# Patient Record
Sex: FEMALE | Race: WHITE | NOT HISPANIC OR LATINO | URBAN - METROPOLITAN AREA
[De-identification: names, ages, dates, MRNs, and addresses within clinical notes are randomized per-mention and may not be internally consistent; named-entity substitution may affect disease eponyms.]

---

## 2019-08-16 ENCOUNTER — HOSPITAL ENCOUNTER (OUTPATIENT)
Dept: URGENT CARE | Facility: CLINIC | Age: 29
Discharge: HOME | End: 2019-08-16

## 2019-08-19 LAB
AMOXICILLIN+CLAV SUSC ISLT: <=2
AMPICILLIN SUSC ISLT: <=2
AMPICILLIN+SULBAC SUSC ISLT: <=2
BACTERIA UR CULT: ABNORMAL
CEFAZOLIN SUSC ISLT: <=4
CEFEPIME SUSC ISLT: <=1
CEFTAZIDIME SUSC ISLT: <=1
CEFTRIAXONE SUSC ISLT: <=1
CEFUROXIME ORAL SUSC ISLT: 4
CEFUROXIME PARENTER SUSC ISLT: 4
CIPROFLOXACIN SUSC ISLT: <=0.25
ERTAPENEM SUSC ISLT: <=0.5
GENTAMICIN SUSC ISLT: <=1
LEVOFLOXACIN SUSC ISLT: <=0.12
NITROFURANTOIN SUSC ISLT: <=16
TETRACYCLINE SUSC ISLT: <=1
TMP SMX SUSC ISLT: <=20
TOBRAMYCIN SUSC ISLT: <=1

## 2020-07-30 ENCOUNTER — HOSPITAL ENCOUNTER (OUTPATIENT)
Dept: OTHER | Facility: HOSPITAL | Age: 30
Discharge: HOME | End: 2020-07-30
Attending: OBSTETRICS & GYNECOLOGY

## 2020-08-06 ENCOUNTER — HOSPITAL ENCOUNTER (OUTPATIENT)
Dept: PREADMISSION TESTING | Facility: HOSPITAL | Age: 30
Discharge: HOME | End: 2020-08-06
Attending: OBSTETRICS & GYNECOLOGY

## 2020-08-08 LAB — SARS-COV-2 RNA SPEC QL NAA+PROBE: NOT DETECTED

## 2020-08-11 ENCOUNTER — HOSPITAL ENCOUNTER (OUTPATIENT)
Dept: PERIOP | Facility: HOSPITAL | Age: 30
Setting detail: HOSPITAL OUTPATIENT SURGERY
Discharge: HOME | End: 2020-08-11
Attending: OBSTETRICS & GYNECOLOGY

## 2020-08-11 LAB — HCG UR QL: NEGATIVE

## 2020-10-22 ENCOUNTER — HOSPITAL ENCOUNTER (OUTPATIENT)
Dept: URGENT CARE | Facility: CLINIC | Age: 30
Discharge: HOME | End: 2020-10-22
Attending: NURSE PRACTITIONER

## 2020-10-25 LAB — SARS-COV-2 RNA SPEC QL NAA+PROBE: NOT DETECTED

## 2020-10-26 ENCOUNTER — HOSPITAL ENCOUNTER (OUTPATIENT)
Dept: URGENT CARE | Facility: CLINIC | Age: 30
Discharge: HOME | End: 2020-10-26
Attending: PHYSICIAN ASSISTANT

## 2020-10-26 LAB — GLUCOSE BLD-MCNC: 76 MG/DL (ref 65–99)

## 2020-11-04 ENCOUNTER — OFFICE VISIT CONVERTED (OUTPATIENT)
Dept: FAMILY MEDICINE CLINIC | Facility: CLINIC | Age: 30
End: 2020-11-04
Attending: NURSE PRACTITIONER

## 2020-11-04 ENCOUNTER — HOSPITAL ENCOUNTER (OUTPATIENT)
Dept: FAMILY MEDICINE CLINIC | Facility: CLINIC | Age: 30
Discharge: HOME | End: 2020-11-04
Attending: NURSE PRACTITIONER

## 2020-11-04 LAB
ALBUMIN SERPL-MCNC: 4.7 G/DL (ref 3.5–5)
ALBUMIN/GLOB SERPL: 1.6 {RATIO} (ref 1.4–2.6)
ALP SERPL-CCNC: 55 U/L (ref 42–98)
ALT SERPL-CCNC: 13 U/L (ref 10–40)
ANION GAP SERPL CALC-SCNC: 16 MMOL/L (ref 8–19)
AST SERPL-CCNC: 16 U/L (ref 15–50)
BASOPHILS # BLD AUTO: 0.04 10*3/UL (ref 0–0.2)
BASOPHILS NFR BLD AUTO: 0.8 % (ref 0–3)
BILIRUB SERPL-MCNC: 0.3 MG/DL (ref 0.2–1.3)
BUN SERPL-MCNC: 12 MG/DL (ref 5–25)
BUN/CREAT SERPL: 16 {RATIO} (ref 6–20)
CALCIUM SERPL-MCNC: 9.4 MG/DL (ref 8.7–10.4)
CHLORIDE SERPL-SCNC: 104 MMOL/L (ref 99–111)
CHOLEST SERPL-MCNC: 174 MG/DL (ref 107–200)
CHOLEST/HDLC SERPL: 2.3 {RATIO} (ref 3–6)
CONV ABS IMM GRAN: 0.01 10*3/UL (ref 0–0.2)
CONV CO2: 24 MMOL/L (ref 22–32)
CONV IMMATURE GRAN: 0.2 % (ref 0–1.8)
CONV TOTAL PROTEIN: 7.6 G/DL (ref 6.3–8.2)
CREAT UR-MCNC: 0.73 MG/DL (ref 0.5–0.9)
DEPRECATED RDW RBC AUTO: 42.4 FL (ref 36.4–46.3)
EOSINOPHIL # BLD AUTO: 0.19 10*3/UL (ref 0–0.7)
EOSINOPHIL # BLD AUTO: 3.6 % (ref 0–7)
ERYTHROCYTE [DISTWIDTH] IN BLOOD BY AUTOMATED COUNT: 12.8 % (ref 11.7–14.4)
FOLATE SERPL-MCNC: >20 NG/ML (ref 4.8–20)
GFR SERPLBLD BASED ON 1.73 SQ M-ARVRAT: >60 ML/MIN/{1.73_M2}
GLOBULIN UR ELPH-MCNC: 2.9 G/DL (ref 2–3.5)
GLUCOSE SERPL-MCNC: 82 MG/DL (ref 65–99)
HCT VFR BLD AUTO: 41.2 % (ref 37–47)
HDLC SERPL-MCNC: 76 MG/DL (ref 40–60)
HGB BLD-MCNC: 13.5 G/DL (ref 12–16)
LDLC SERPL CALC-MCNC: 82 MG/DL (ref 70–100)
LYMPHOCYTES # BLD AUTO: 1.23 10*3/UL (ref 1–5)
LYMPHOCYTES NFR BLD AUTO: 23.6 % (ref 20–45)
MCH RBC QN AUTO: 29.9 PG (ref 27–31)
MCHC RBC AUTO-ENTMCNC: 32.8 G/DL (ref 33–37)
MCV RBC AUTO: 91.2 FL (ref 81–99)
MONOCYTES # BLD AUTO: 0.47 10*3/UL (ref 0.2–1.2)
MONOCYTES NFR BLD AUTO: 9 % (ref 3–10)
NEUTROPHILS # BLD AUTO: 3.28 10*3/UL (ref 2–8)
NEUTROPHILS NFR BLD AUTO: 62.8 % (ref 30–85)
NRBC CBCN: 0 % (ref 0–0.7)
OSMOLALITY SERPL CALC.SUM OF ELEC: 287 MOSM/KG (ref 273–304)
PLATELET # BLD AUTO: 216 10*3/UL (ref 130–400)
PMV BLD AUTO: 10.5 FL (ref 9.4–12.3)
POTASSIUM SERPL-SCNC: 4.9 MMOL/L (ref 3.5–5.3)
RBC # BLD AUTO: 4.52 10*6/UL (ref 4.2–5.4)
SODIUM SERPL-SCNC: 139 MMOL/L (ref 135–147)
TRIGL SERPL-MCNC: 81 MG/DL (ref 40–150)
TSH SERPL-ACNC: 1.09 M[IU]/L (ref 0.27–4.2)
VIT B12 SERPL-MCNC: >2000 PG/ML (ref 211–911)
VLDLC SERPL-MCNC: 16 MG/DL (ref 5–37)
WBC # BLD AUTO: 5.22 10*3/UL (ref 4.8–10.8)

## 2020-11-05 LAB
25(OH)D3 SERPL-MCNC: 41 NG/ML (ref 30–100)
FERRITIN SERPL-MCNC: 52 NG/ML (ref 10–200)
IRON SATN MFR SERPL: 33 % (ref 20–55)
IRON SERPL-MCNC: 111 UG/DL (ref 60–170)
TIBC SERPL-MCNC: 335 UG/DL (ref 245–450)
TRANSFERRIN SERPL-MCNC: 234 MG/DL (ref 250–380)

## 2020-11-16 ENCOUNTER — HOSPITAL ENCOUNTER (OUTPATIENT)
Dept: CT IMAGING | Facility: HOSPITAL | Age: 30
Discharge: HOME | End: 2020-11-16
Attending: NURSE PRACTITIONER

## 2020-11-18 ENCOUNTER — CONVERSION ENCOUNTER (OUTPATIENT)
Dept: FAMILY MEDICINE CLINIC | Facility: CLINIC | Age: 30
End: 2020-11-18

## 2020-11-18 ENCOUNTER — OFFICE VISIT CONVERTED (OUTPATIENT)
Dept: FAMILY MEDICINE CLINIC | Facility: CLINIC | Age: 30
End: 2020-11-18
Attending: NURSE PRACTITIONER

## 2021-05-10 NOTE — H&P
History and Physical      Patient Name: Dorita Massey   Patient ID: [id number]   Sex: Female   YOB: 1990        Visit Date: November 4, 2020    Provider: KJ Armando   Location: [facility] [place]   Location Address: [address] [place] [address], [address]  [place], [place]  [id number]   Location Phone: [phone]          Chief Complaint  · Establish care  · Annual physical exam  · Anxiety and depression      History Of Present Illness  Dorita Massey is a 30 year old /White female who presents for evaluation and treatment of:      Patient is a pleasant 30-year-old female who comes in today to establish care.  Patient states for the past several weeks she has been having daily headaches.  She states she is also felt very dizzy.  She has been seen by her OB/GYN and it was recommended that she get a PCP for high blood pressure and dizziness.  Patient's blood pressure today is 138/80, she states that she does have a mild headache in office she was given a low-dose of Norvasc from an acute care center, she states that she could not tolerate the medication it made her feel worse, it increase the dizziness and fatigue.    Patient has been having issues with anxiety and depression.  She is currently in counseling and sees a counselor twice a week.  She has an appointment to see a psychiatric nurse practitioner November 27, 2020.  She recently had GeneSight testing done and has brought the results with her to see if I can put her on something in the meantime until she sees the nurse practitioner.  She states that she has had thoughts of suicidal ideation in the past but does not currently have any thoughts.  She has strong family support including her .  She declined a flu vaccine.  She has never smoked.  Last Pap smear was August 2020 by Dr. Ko       Past Medical History  Disease Name Date Onset Notes   Allergies --  --        Allergies Reconciled  Review of  "Systems  · Constitutional  o Denies  o : fever, fatigue, weight loss, weight gain  · Eyes  o Denies  o : double vision, impaired vision, blurred vision  · HENT  o Admits  o : headaches, vertigo  o Denies  o : lightheadedness, sinus pain, nasal congestion  · Cardiovascular  o Denies  o : lower extremity edema, claudication, chest pressure, palpitations  · Respiratory  o Denies  o : shortness of breath, wheezing, cough, hemoptysis, dyspnea on exertion  · Gastrointestinal  o Denies  o : nausea, vomiting, diarrhea, constipation, abdominal pain  · Integument  o Denies  o : rash, itching, pigmentation changes  · Musculoskeletal  o Denies  o : joint pain, joint swelling, muscle pain  · Psychiatric  o Admits  o : anxiety, depression  o Denies  o : suicidal ideation, homicidal ideation      Vitals  Date Time BP Position Site L\R Cuff Size HR RR TEMP (F) WT  HT  BMI kg/m2 BSA m2 O2 Sat FR L/min FiO2        11/04/2020 02:23 /80 Sitting    90 - R  97.6 150lbs 8oz 5'  8\" 22.88 1.81 100 %            Physical Examination  · Constitutional  o Appearance  o : well-nourished, well developed, in no acute distress  · Eyes  o Conjunctivae  o : conjunctivae normal, no exudates present  o Sclerae  o : sclerae white  o Pupils and Irises  o : pupils equal and round, and reactive to light and accomodation bilaterally  o Eyelids/Ocular Adnexae  o : extra ocular movements intact  · Respiratory  o Respiratory Effort  o : breathing unlabored, no accessory muscle use  o Inspection of Chest  o : normal appearance, no retractions  o Auscultation of Lungs  o : normal breath sounds bilaterally  · Cardiovascular  o Heart  o :   § Auscultation of Heart  § : regular rate and rhythm, no murmurs, gallops or rubs  o Peripheral Vascular System  o :   § Extremities  § : no edema  · Neurologic  o Mental Status Examination  o :   § Orientation  § : alert and oriented x3  § Speech/Language  § : normal speech pattern  o Gait and Station  o : normal gait, " able to stand without difficulty  · Psychiatric  o Judgement and Insight  o : judgment and insight intact, judgement for everyday activities and social situations within normal limits, insight intact  o Thought Processes  o : rate of thoughts normal, thought content logical  o Mood and Affect  o : mood normal, affect appropriate              Assessment  · Annual physical exam     V70.0/Z00.00  · Anemia     285.9/D64.9  History of anemia will check labs to rule out any acute underlying issues she is currently on iron supplement daily.  · Anxiety disorder     300.00/F41.9  Will start on low-dose of Effexor along with some BuSpar for breakthrough anxiety. Will patient follow-up in 2 weeks to reevaluate blood pressure and see how she is feeling. Patient verbalized understanding is agreeable treatment plan.  · Depression     311/F32.9  · Fatigue     780.79/R53.83  Will check labs to rule out any acute underlying issues.  · Headache     784.0/R51  New onset headaches will do CT of head to rule out any acute underlying issues. Could be related to anxiety versus hypertension although her blood pressure in the office was slightly elevated stage I hypertension at 138/80. We will continue to monitor patient's progress with a 2-week follow-up.  · Vitamin D deficiency     268.9/E55.9  History of vitamin D deficiency she is currently on vitamin D supplement we will check labs to make sure that the vitamin supplement she has is adequate.  · Screening for depression     V79.0/Z13.89  Positive depression screening started on low-dose of Effexor and BuSpar will have her follow-up in 2 weeks, encouraged to keep appointment with Marissa Parson psychiatric nurse practitioner.  · New onset headache     784.0/R51      Plan  · Orders  o B12 Folate levels (B12FO) - 285.9/D64.9 - 11/04/2020  o Iron panel (iron, TIBC, transferrin saturation) (00366, 38782, 13063) - 285.9/D64.9 - 11/04/2020  o Ferritin ser/plas (67746) - 285.9/D64.9 -  11/04/2020  o Vitamin D Level (74203) - 268.9/E55.9 - 11/04/2020  o Physical, Primary Care Panel (CBC, CMP, Lipid, TSH) [facility] (63269, 58105, 93953, 61371) - V70.0/Z00.00, 780.79/R53.83, 300.00/F41.9, 311/F32.9 - 11/04/2020  o ACO-39: Current medications updated and reviewed (1159F, ) - - 11/04/2020  o ACO-18: Positive screen for clinical depression using a standardized tool and a follow-up plan documented () - - 11/04/2020   20  o ACO-14: Influenza immunization was not administered for reasons documented [facility] () - - 11/04/2020  o CT Head/Brain without IV Contrast [facility] (98148) - 784.0/R51 - 11/04/2020  · Medications  o buspirone 7.5 mg oral tablet   SIG: take 1 tablet by oral route 3 times a day as needed for 30 days   DISP: (90) Tablet with 2 refills  Prescribed on 11/04/2020     o Effexor XR 37.5 mg oral capsule,extended release 24hr   SIG: take 1 capsule (37.5 mg) by oral route once daily with food for 30 days   DISP: (30) Capsule with 0 refills  Prescribed on 11/04/2020     · Instructions  o Reviewed health maintenance flowsheet and updated information. Orders were placed and/or patient's response was documented.  o Patient was given an SSRI/SSNRI medication and warned of possible side effects of the medication including potential for increased risk of suicidal thoughts and feelings. Patient was instructed that if they begin to exhibit any of these effects they will discontinue the medication immediately and contact our office or the ER ASAP.  o Patient was given an SSRI/SSNRI medication and warned of possible side effects of the medication including potential for increased risk of suicidal thoughts and feelings. Patient was instructed that if they begin to exhibit any of these effects they will discontinue the medication immediately and contact our office or the ER ASAP.  o Depression Screen completed and scanned into the EMR under the designated folder within the patient's documents.  o Today's PHQ-9  result is __20_  o Take all medications as prescribed/directed.  o Patient was educated/instructed on their diagnosis, treatment and medications prior to discharge from the clinic today.  · Disposition  o Call or Return if symptoms worsen or persist.  o follow up as needed  o call the office with any questions or concerns  o Follow-up in 2 weeks            Electronically Signed by: Marci Bean APRN -Author on November 4, 2020 03:42:23 PM

## 2021-05-13 NOTE — PROGRESS NOTES
Progress Note      Patient Name: Dorita Massey   Patient ID: [id number]   Sex: Female   YOB: 1990    Primary Care Provider: Marci UNDERWOOD    Visit Date: 2020    Provider: KJ Armando   Location: [facility] [place]   Location Address: [address] [place] [address], [address]  [id number]   Location Phone: [phone]          Chief Complaint  · 2 week F/U  · Migraines      History Of Present Illness  Dorita Massey is a 30 year old /White female who presents for evaluation and treatment of:      Patient is a 30-year-old female who comes in for follow-up on anxiety and depression medication.  She states that she does notice a difference with the Effexor and BuSpar.  She states she does feel like it needs to be an increase some she is not currently having any side effects with the medication.    Patient was seen 2 weeks ago she was complaining of headaches daily at that time, CT of her head was done it was negative.  She states she has a headache every day starts in the back of her head and goes around, feels like viselike and tension she takes multiple doses of Tylenol/Motrin/Excedrin Migraine daily to try to get the headaches under control.  She is not currently been on abortive therapy or any medications for her migraines.  She is moving to [place] this upcoming weekend so does not have the ability to see neurology locally before she moves.       Past Medical History  Disease Name Date Onset Notes   Allergies --  --    Anemia --  --    Anxiety --  --    Asthma --  --    Bipolar disorder --  --    Depression --  --    Eczema --  --    Forgetfulness --  --    Headache --  --    High blood pressure --  --          Past Surgical History  Procedure Name Date Notes   Blood transfusion --  --     section ,  --    Endometrial ablation  --    Laproscopy --  --          Medication List  Name Date Started Instructions   buspirone 10 mg oral  "tablet 11/18/2020 take 1 tablet by oral route 3 times a day as needed for 90 days anxiety   Effexor XR 75 mg oral capsule,extended release 24hr 11/18/2020 take 1 capsule (75 mg) by oral route once daily with food for 90 days   ibuprofen 800 mg oral tablet  take 1 tablet (800 mg) by oral route 3 times per day with food   Nurtec ODT 75 mg oral tablet,disintegrating 11/18/2020 dissolve 1 tablet by oral route daily as needed for 30 days migraine   vitamin B12-folic acid 500-400 mcg oral tablet  take 1 tablet by oral route 2 times a day       Allergies Reconciled  Social History  Finding Status Start/Stop Quantity Notes   Tobacco Never --/-- --  --          Immunizations  NameDate Admin Mfg Trade Name Lot Number Route Inj VIS Given VIS Publication   InfluenzaRefused 11/04/2020 NE Not Entered  NE NE     Comments:          Review of Systems  · Constitutional  o Denies  o : fever, fatigue, weight loss, weight gain  · HENT  o Admits  o : headaches  o Denies  o : vertigo, lightheadedness  · Cardiovascular  o Denies  o : lower extremity edema, claudication, chest pressure, palpitations  · Respiratory  o Denies  o : shortness of breath, wheezing, cough, hemoptysis, dyspnea on exertion  · Gastrointestinal  o Denies  o : nausea, vomiting, diarrhea, constipation, abdominal pain  · Neurologic  o Denies  o : altered mental status, muscular weakness, incoordination  · Psychiatric  o Admits  o : anxiety, depression (improving on medication)  o Denies  o : suicidal ideation, homicidal ideation      Vitals  Date Time BP Position Site L\R Cuff Size HR RR TEMP (F) WT  HT  BMI kg/m2 BSA m2 O2 Sat FR L/min FiO2 HC       11/18/2020 01:01 /76 Sitting    89 - R  98 150lbs 8oz 5'  8\" 22.88 1.81 96 %      11/18/2020 01:02 /76 Sitting                       Physical Examination  · Constitutional  o Appearance  o : well-nourished, well developed, in no acute distress  · Eyes  o Conjunctivae  o : conjunctivae normal, no exudates " present  o Sclerae  o : sclerae white  o Pupils and Irises  o : pupils equal and round, and reactive to light and accomodation bilaterally  o Eyelids/Ocular Adnexae  o : extra ocular movements intact  · Respiratory  o Respiratory Effort  o : breathing unlabored, no accessory muscle use  o Inspection of Chest  o : normal appearance, no retractions  o Auscultation of Lungs  o : normal breath sounds bilaterally  · Cardiovascular  o Heart  o :   § Auscultation of Heart  § : regular rate and rhythm, no murmurs, gallops or rubs  o Peripheral Vascular System  o :   § Extremities  § : no edema  · Neurologic  o Mental Status Examination  o :   § Orientation  § : alert and oriented x3  § Speech/Language  § : normal speech pattern  o Gait and Station  o : normal gait, able to stand without difficulty  · Psychiatric  o Judgement and Insight  o : judgment and insight intact, judgement for everyday activities and social situations within normal limits, insight intact  o Thought Processes  o : rate of thoughts normal, thought content logical  o Mood and Affect  o : mood normal, affect appropriate              Assessment  · Headache disorder     784.0/R51  Will start on Topamax for abortive therapy, patient given samples her Nurtec, and Zofran as needed for nausea. Discussed with patient to establish with PCP ASAP when she moves and get in with neurology for further follow-up and work-up for her migraines.  · Anxiety and depression       Anxiety disorder, unspecified     300.00/F41.9  Major depressive disorder, single episode, unspecified     300.00/F32.9  Will increase Effexor 75 mg BuSpar to 10 mg daily until patient can establish a PCP at her new primary residence in [place].      Plan  · Orders  o ACO-39: Current medications updated and reviewed (, 1159F) - - 11/18/2020  o ACO-14: Influenza immunization was not administered for reasons documented [facility] () - - 11/18/2020  · Medications  o ondansetron 4 mg oral  tablet,disintegrating   SIG: dissolve 1 tablet by oral route 3 times a day as needed for 10 days nausea   DISP: (30) Tablet with 0 refills  Prescribed on 11/18/2020     o Topamax 25 mg oral tablet   SIG: take 1 tablet by oral route once a day (at bedtime) for 90 days   DISP: (90) Tablet with 0 refills  Prescribed on 11/18/2020     o baclofen 10 mg oral tablet   SIG: take 1 tablet by oral route 3 times a day as needed for 30 days   DISP: (90) Tablet with 1 refills  Prescribed on 11/18/2020     o Nurtec ODT 75 mg oral tablet,disintegrating   SIG: dissolve 1 tablet by oral route daily as needed for 30 days migraine   DISP: (30) Tablet with 0 refills  Adjusted on 11/18/2020     o buspirone 10 mg oral tablet   SIG: take 1 tablet by oral route 3 times a day as needed for 90 days anxiety   DISP: (270) Tablet with 1 refills  Adjusted on 11/18/2020     o Effexor XR 75 mg oral capsule,extended release 24hr   SIG: take 1 capsule (75 mg) by oral route once daily with food for 90 days   DISP: (90) Capsule with 1 refills  Adjusted on 11/18/2020     · Instructions  o Take all medications as prescribed/directed.  o Patient was educated/instructed on their diagnosis, treatment and medications prior to discharge from the clinic today.  o Call the office with any concerns or questions.  · Disposition  o Call or Return if symptoms worsen or persist.  o follow up as needed  o call the office with any questions or concerns            Electronically Signed by: Marci Bean APRN -Author on November 18, 2020 02:49:24 PM

## 2021-05-14 VITALS
HEIGHT: 68 IN | TEMPERATURE: 97.6 F | OXYGEN SATURATION: 100 % | HEART RATE: 90 BPM | WEIGHT: 150.5 LBS | BODY MASS INDEX: 22.81 KG/M2 | DIASTOLIC BLOOD PRESSURE: 80 MMHG | SYSTOLIC BLOOD PRESSURE: 138 MMHG

## 2021-05-14 VITALS
SYSTOLIC BLOOD PRESSURE: 146 MMHG | WEIGHT: 150.5 LBS | DIASTOLIC BLOOD PRESSURE: 76 MMHG | BODY MASS INDEX: 22.81 KG/M2 | TEMPERATURE: 98 F | HEIGHT: 68 IN | HEART RATE: 89 BPM | OXYGEN SATURATION: 96 %